# Patient Record
Sex: FEMALE | Race: WHITE | NOT HISPANIC OR LATINO | Employment: PART TIME | URBAN - NONMETROPOLITAN AREA
[De-identification: names, ages, dates, MRNs, and addresses within clinical notes are randomized per-mention and may not be internally consistent; named-entity substitution may affect disease eponyms.]

---
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
## 2023-01-03 ENCOUNTER — HOSPITAL ENCOUNTER (OUTPATIENT)
Dept: PHYSICAL THERAPY | Facility: OTHER | Age: 18
Setting detail: THERAPIES SERIES
Discharge: HOME | End: 2023-01-03
Attending: CHIROPRACTOR
Payer: COMMERCIAL

## 2023-01-03 PROCEDURE — 97161 PT EVAL LOW COMPLEX 20 MIN: CPT | Mod: GP,PO

## 2023-01-03 PROCEDURE — 97110 THERAPEUTIC EXERCISES: CPT | Mod: GP,PO

## 2023-01-04 NOTE — PROGRESS NOTES
01/03/23 1400   General Information   Type of Visit Initial OP Ortho PT Evaluation   Start of Care Date 01/03/23   Referring Physician Arie Tay DC (PCP: Delma Carey APRN CNP)   Patient/Family Goals Statement To reduce her apin   Orders Evaluate and Treat   Date of Order 11/08/22   Certification Required? Yes   Medical Diagnosis Chronic midline low back pain with left-sided sciatica (M54.42, G89.29), Lumbar radiculopathy (M54.16), Segmental dysfunction of pelvic region (M99.05)   Surgical/Medical history reviewed Yes   Precautions/Limitations no known precautions/limitations       Present No   Body Part(s)   Body Part(s) Lumbar Spine/SI   Presentation and Etiology   Pertinent history of current problem (include personal factors and/or comorbidities that impact the POC) Patient is a 17 year old female referred to physical therapy, specifically the spine therapy program, for chronic low back pain. She started having this pain in 2018. After about 1 year of conservative treatment including PT and chiro, she decided to have surgery. She had a Laminectomy and diskectomy in October of 2019. Had some more physical therapy after this and did really well. In June of this year, she started to develop more back pain near graduation of high school. There was no injury that brought this on however her pain has just progressively worsened since that time. She has again tried chiropractic care with no long term relief. She has stopped going to this. She has back pain and symptoms into her left LE. This limits her physically and has been draining on other aspects of her life.   Impairments A. Pain;B. Decreased WB tolerance;D. Decreased ROM;E. Decreased flexibility;F. Decreased strength and endurance;K. Numbness;J. Burning   Functional Limitations perform activities of daily living;perform required work activities;perform desired leisure / sports activities   Symptom Location Low back and left LE    How/Where did it occur From insidious onset   Onset date of current episode/exacerbation 06/01/22   Chronicity New   Pain rating (0-10 point scale) Best (/10);Worst (/10)   Best (/10) 3   Worst (/10) 8   Pain quality A. Sharp;B. Dull;C. Aching;D. Burning;E. Shooting;F. Stabbing;G. Cramping   Frequency of pain/symptoms A. Constant   Pain/symptoms are: Worse during the day   Pain/symptoms exacerbated by A. Sitting;B. Walking;C. Lifting;D. Carrying;G. Certain positions;H. Overhead reach;I. Bending;J. ADL;K. Home tasks;L. Work tasks   Pain/symptoms eased by C. Rest;E. Changing positions;F. Certain positions;H. Cold   Prior Level of Function   Prior Level of Function-Mobility Independent   Prior Level of Function-ADLs Independent   Current Level of Function   Current Community Support Family/friend caregiver   Patient role/employment history A. Employed;B. 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 No   Have you fallen and had an injury in the past year? Yes   Is patient a fall risk? No   Fall screen comments Patient slipped and fell on her tailbone   Abuse Screen (yes response referral indicated)   Physical Signs of Abuse Present no   System Outcome Measures   Outcome Measures Low Back Pain (see Osw[place] and [place])   Lumbar Spine/SI Objective Findings   Neurological Testing Comments Positive slump test   Gait/Locomotion Slower gait speed but no major deviations noted on this date   Hamstring Flexibility Min limited on right, mod limited on left   Hip Flexor Flexibility Min limited   Piriformis Flexibility Min  limited bilaterally   Flexion ROM Patient is able to just reach slightly below her tibial plateau with bending forward   Extension ROM Min limited secondary to pain   Right Side Bending ROM Able to reach her lateral knee joint secondary to stiffness on her left side   Left Side Bending ROM Able to reach lateral knee joint line with fingertips secondary to pain   Lumbar ROM Comment Rotation: WFL   Hip Screen Scour; Negative, ARJUN/FADIR: negative   Hip Flexion (L2) Strength 4/5 on left, 5/5 on right   Hip Abduction Strength 5/5   Hip Adduction Strength 5/5   Knee Flexion Strength 5/5   Knee Extension (L3) Strength 4-/5 on left, 5/5 on right   Ankle Dorsiflexion (L4) Strength 5/5   Palpation Discomfort noted in lumbar paraspinals and gluteus medius bilaterally but L>R   Slump Test Positive on left   Observation Patient resting comfortably in chair. No apparent distress   Integumentary No significant findings   Posture Slight protraction of shoulders   Sensation Testing Intact to light touch   Planned Therapy Interventions   Planned Therapy Interventions gait training;joint mobilization;manual therapy;neuromuscular re-education;ROM;strengthening;stretching   Planned Modality Interventions   Planned Modality Interventions Cryotherapy;Electrical stimulation;Hot packs;Ultrasound;Traction   Clinical Impression   Criteria for Skilled Therapeutic Interventions Met yes, treatment indicated   PT Diagnosis Impaired mobility, decreased strength and endurance, low back pain   Influenced by the following impairments pain, stiffness, weakness   Functional limitations due to impairments ambulation, standing, reaching, squatting   Clinical Presentation Stable/Uncomplicated   Clinical Presentation Rationale Clinical judgement   Clinical Decision Making (Complexity) Low complexity   Therapy Frequency 2 times/Week   Predicted Duration of Therapy Intervention (days/wks) 8-12 weeks   Risk & Benefits of therapy have been explained Yes    Patient, Family & other staff in agreement with plan of care Yes   Clinical Impression Comments Signs and symptoms consistent with chronic low back pain. Patient has more discomfort recently in her back and down her left LE. She had MRI's where it was reported she has another bulge at the L4-L5 level where the previous issue was a level lower. She has tried some conservative treatments but this has been unsuccessful this time around. SHe would benefit from skilled PT services, specifically the spine therapy program, in order to reduce her pain/symptoms and improve her mobility, strength, and endurance.   Education Assessment   Barriers to Learning No barriers   ORTHO GOALS   PT Ortho Eval Goals 1;2;3   Ortho Goal 1   Goal Identifier Ambulation   Goal Description Patient will be able to ambulate for longer than 10 minutes with no increase in pain in her back or leg in order to improve her overall mobility in the community   Target Date 02/28/23   Ortho Goal 2   Goal Identifier House tasks   Goal Description Patient will be able to complete all indoor household tasks, such as dishes, laundry, and cooking, with no increase in back or leg pain in order to improve her function at home and improve her ability to live on her own.   Target Date 02/28/23   Ortho Goal 3   Goal Identifier Sitting   Goal Description Patient will be able to sit for longer than 60 minutes with no increase in back or leg pain in order to improve her efficiency and safety with travel in a vehicle.   Target Date 02/28/23   Total Evaluation Time   PT Eval, Low Complexity Minutes (91513) 45   Therapy Certification   Certification date from 01/03/23   Certification date to 02/28/23   Medical Diagnosis Chronic midline low back pain with left-sided sciatica (M54.42, G89.29), Lumbar radiculopathy (M54.16), Segmental dysfunction of pelvic region (M99.05)

## 2023-01-04 NOTE — PROGRESS NOTES
[facility]    OUTPATIENT PHYSICAL THERAPY ORTHOPEDIC EVALUATION  PLAN OF TREATMENT FOR OUTPATIENT REHABILITATION  (COMPLETE FOR INITIAL CLAIMS ONLY)  Patient's Last Name, First Name, M.I.  YOB: 2005  Divya Evans    Provider s Name:  [facility]   Medical Record No.  [id number]   Start of Care Date:  01/03/23   Onset Date:  06/01/22   Type:     _X__PT   ___OT   ___SLP Medical Diagnosis:  Chronic midline low back pain with left-sided sciatica (M54.42, G89.29), Lumbar radiculopathy (M54.16), Segmental dysfunction of pelvic region (M99.05)     PT Diagnosis:  Impaired mobility, decreased strength and endurance, low back pain   Visits from SOC:  1      _________________________________________________________________________________  Plan of Treatment/Functional Goals:  gait training, joint mobilization, manual therapy, neuromuscular re-education, ROM, strengthening, stretching     Cryotherapy, Electrical stimulation, Hot packs, Ultrasound, Traction     Goals  Goal Identifier: Ambulation  Goal Description: Patient will be able to ambulate for longer than 10 minutes with no increase in pain in her back or leg in order to improve her overall mobility in the community  Target Date: 02/28/23    Goal Identifier: House tasks  Goal Description: Patient will be able to complete all indoor household tasks, such as dishes, laundry, and cooking, with no increase in back or leg pain in order to improve her function at home and improve her ability to live on her own.  Target Date: 02/28/23    Goal Identifier: Sitting  Goal Description: Patient will be able to sit for longer than 60 minutes with no increase in back or leg pain in order to improve her efficiency and safety with travel in a vehicle.  Target Date: 02/28/23         Therapy Frequency:  2 times/Week  Predicted Duration  of Therapy Intervention:  8-12 weeks    Matt Arrington, PT                 I CERTIFY THE NEED FOR THESE SERVICES FURNISHED UNDER        THIS PLAN OF TREATMENT AND WHILE UNDER MY CARE     (Physician co-signature of this document indicates review and certification of the therapy plan).                     Certification Date From:  01/03/23   Certification Date To:  02/28/23    Referring Provider:  Arie Tay DC (PCP: Delma Carey APRN CNP)    Initial Assessment        See Epic Evaluation Start of Care Date: 01/03/23

## 2023-01-05 ENCOUNTER — HOSPITAL ENCOUNTER (OUTPATIENT)
Dept: PHYSICAL THERAPY | Facility: OTHER | Age: 18
Setting detail: THERAPIES SERIES
Discharge: HOME | End: 2023-01-05
Attending: CHIROPRACTOR
Payer: COMMERCIAL

## 2023-01-05 PROCEDURE — 97110 THERAPEUTIC EXERCISES: CPT | Mod: GP,PO

## 2023-01-05 PROCEDURE — 97140 MANUAL THERAPY 1/> REGIONS: CPT | Mod: GP,PO

## 2023-01-10 ENCOUNTER — HOSPITAL ENCOUNTER (OUTPATIENT)
Dept: PHYSICAL THERAPY | Facility: OTHER | Age: 18
Setting detail: THERAPIES SERIES
Discharge: HOME | End: 2023-01-10
Attending: CHIROPRACTOR
Payer: COMMERCIAL

## 2023-01-10 PROCEDURE — 97140 MANUAL THERAPY 1/> REGIONS: CPT | Mod: GP,PO

## 2023-01-10 PROCEDURE — 97110 THERAPEUTIC EXERCISES: CPT | Mod: GP,PO

## 2023-01-12 ENCOUNTER — HOSPITAL ENCOUNTER (OUTPATIENT)
Dept: PHYSICAL THERAPY | Facility: OTHER | Age: 18
Setting detail: THERAPIES SERIES
Discharge: HOME | End: 2023-01-12
Attending: CHIROPRACTOR
Payer: COMMERCIAL

## 2023-01-12 PROCEDURE — 97110 THERAPEUTIC EXERCISES: CPT | Mod: GP,PO

## 2023-01-12 PROCEDURE — 97140 MANUAL THERAPY 1/> REGIONS: CPT | Mod: GP,PO

## 2023-01-17 ENCOUNTER — HOSPITAL ENCOUNTER (OUTPATIENT)
Dept: PHYSICAL THERAPY | Facility: OTHER | Age: 18
Setting detail: THERAPIES SERIES
Discharge: HOME | End: 2023-01-17
Attending: CHIROPRACTOR
Payer: COMMERCIAL

## 2023-01-17 PROCEDURE — 97110 THERAPEUTIC EXERCISES: CPT | Mod: GP,PO

## 2023-01-17 PROCEDURE — 97140 MANUAL THERAPY 1/> REGIONS: CPT | Mod: GP,PO

## 2023-01-19 ENCOUNTER — HOSPITAL ENCOUNTER (OUTPATIENT)
Dept: PHYSICAL THERAPY | Facility: OTHER | Age: 18
Setting detail: THERAPIES SERIES
Discharge: HOME | End: 2023-01-19
Attending: CHIROPRACTOR
Payer: COMMERCIAL

## 2023-01-19 PROCEDURE — 97110 THERAPEUTIC EXERCISES: CPT | Mod: GP,PO

## 2023-01-19 PROCEDURE — 97140 MANUAL THERAPY 1/> REGIONS: CPT | Mod: GP,PO

## 2023-01-26 ENCOUNTER — HOSPITAL ENCOUNTER (OUTPATIENT)
Dept: PHYSICAL THERAPY | Facility: OTHER | Age: 18
Setting detail: THERAPIES SERIES
Discharge: HOME | End: 2023-01-26
Attending: CHIROPRACTOR
Payer: COMMERCIAL

## 2023-01-26 PROCEDURE — 97110 THERAPEUTIC EXERCISES: CPT | Mod: GP,PO

## 2023-01-31 ENCOUNTER — HOSPITAL ENCOUNTER (OUTPATIENT)
Dept: PHYSICAL THERAPY | Facility: OTHER | Age: 18
Setting detail: THERAPIES SERIES
Discharge: HOME | End: 2023-01-31
Attending: CHIROPRACTOR
Payer: COMMERCIAL

## 2023-01-31 PROCEDURE — 97110 THERAPEUTIC EXERCISES: CPT | Mod: GP,PO

## 2023-01-31 PROCEDURE — 97140 MANUAL THERAPY 1/> REGIONS: CPT | Mod: GP,PO

## 2023-02-02 ENCOUNTER — HOSPITAL ENCOUNTER (OUTPATIENT)
Dept: PHYSICAL THERAPY | Facility: OTHER | Age: 18
Setting detail: THERAPIES SERIES
Discharge: HOME | End: 2023-02-02
Attending: CHIROPRACTOR
Payer: COMMERCIAL

## 2023-02-02 PROCEDURE — 97110 THERAPEUTIC EXERCISES: CPT | Mod: GP,PO

## 2023-02-07 ENCOUNTER — HOSPITAL ENCOUNTER (OUTPATIENT)
Dept: PHYSICAL THERAPY | Facility: OTHER | Age: 18
Setting detail: THERAPIES SERIES
Discharge: HOME | End: 2023-02-07
Attending: CHIROPRACTOR
Payer: COMMERCIAL

## 2023-02-07 ENCOUNTER — OFFICE VISIT (OUTPATIENT)
Dept: FAMILY MEDICINE | Facility: OTHER | Age: 18
End: 2023-02-07
Attending: CHIROPRACTOR
Payer: COMMERCIAL

## 2023-02-07 VITALS
OXYGEN SATURATION: 98 % | WEIGHT: 213 LBS | BODY MASS INDEX: 34.23 KG/M2 | SYSTOLIC BLOOD PRESSURE: 107 MMHG | HEIGHT: 66 IN | RESPIRATION RATE: 18 BRPM | TEMPERATURE: 98.8 F | DIASTOLIC BLOOD PRESSURE: 79 MMHG | HEART RATE: 90 BPM

## 2023-02-07 DIAGNOSIS — G89.29 CHRONIC MIDLINE LOW BACK PAIN WITH LEFT-SIDED SCIATICA: Primary | ICD-10-CM

## 2023-02-07 DIAGNOSIS — M54.16 LUMBAR RADICULOPATHY: ICD-10-CM

## 2023-02-07 DIAGNOSIS — M54.42 CHRONIC MIDLINE LOW BACK PAIN WITH LEFT-SIDED SCIATICA: Primary | ICD-10-CM

## 2023-02-07 DIAGNOSIS — M99.05 SEGMENTAL DYSFUNCTION OF PELVIC REGION: ICD-10-CM

## 2023-02-07 PROCEDURE — 99207 PR BACK PROGRAM: CPT | Performed by: CHIROPRACTOR

## 2023-02-07 PROCEDURE — G0463 HOSPITAL OUTPT CLINIC VISIT: HCPCS

## 2023-02-07 PROCEDURE — 97110 THERAPEUTIC EXERCISES: CPT | Mod: GP,PO

## 2023-02-07 ASSESSMENT — PAIN SCALES - GENERAL: PAINLEVEL: MILD PAIN (3)

## 2023-02-07 NOTE — PROGRESS NOTES
"Spine Therapy Program Mid-treatment Report      Subjective: Divya is here with her mother for follow up of her Spine Program.  She reports very good results thus far, noting 45% improvement as well as improvement in strength and flexibility.  She did take a fall back in December prior to starting therapy, landing directly on her bottom. This aggravated her left SI joint region and it continues to be dull/achy today.      Her mother reports that she can tell there is improvement.  Divya also notes studying yesterday while sitting on a barstool, something that she was unable to do prior to the program.  She is very happy with Matt as the provider and is seeing him after my appointment today.    Objective:  /79   Pulse 90   Temp 98.8  F (37.1  C) (Tympanic)   Resp 18   Ht 1.676 m (5' 6\")   Wt 96.6 kg (213 lb)   SpO2 98%   Breastfeeding No   BMI 34.38 kg/m    NAD.  She still presents with moderate hamstring spasm/tightness.  Sacral leg check positive on the left.  Palpation to left SI joint finds no segmental joint dysfunction, but there is pain with deep palpation.  Hip motion has improved from initial exam.  We also went over her recent x-rays.       Assessment/Plan: Continue plan as scheduled.  She is making acceptable progress. More therapy on posterior leg flexibility and left SI joint modality application is recommended.  Otherwise, her therapist is doing a wonderful job with the case.  I also discussed wearing better shoes and applying moist heat instead of ice.  We will follow up with her in another month at the completion of her plan.     Note to: LETY Hines, CNP      Arie Tay DC, JOSE ANTONIO, MARCIA  Director - Occupational Medicine Department  Diplomate - American Board of Forensic Professionals  Board Certified - American Board of Independent Medical Examiners  [facility]  [address] [place] [address]  Phone [phone]  Fax [phone]      3:19 PM 2/7/2023

## 2023-02-09 ENCOUNTER — HOSPITAL ENCOUNTER (OUTPATIENT)
Dept: PHYSICAL THERAPY | Facility: OTHER | Age: 18
Setting detail: THERAPIES SERIES
Discharge: HOME | End: 2023-02-09
Attending: CHIROPRACTOR
Payer: COMMERCIAL

## 2023-02-09 PROCEDURE — 97110 THERAPEUTIC EXERCISES: CPT | Mod: GP,PO

## 2023-02-14 ENCOUNTER — HOSPITAL ENCOUNTER (OUTPATIENT)
Dept: PHYSICAL THERAPY | Facility: OTHER | Age: 18
Setting detail: THERAPIES SERIES
Discharge: HOME | End: 2023-02-14
Attending: CHIROPRACTOR
Payer: COMMERCIAL

## 2023-02-14 PROCEDURE — 97110 THERAPEUTIC EXERCISES: CPT | Mod: GP,PO

## 2023-02-16 ENCOUNTER — HOSPITAL ENCOUNTER (OUTPATIENT)
Dept: PHYSICAL THERAPY | Facility: OTHER | Age: 18
Setting detail: THERAPIES SERIES
Discharge: HOME | End: 2023-02-16
Attending: CHIROPRACTOR
Payer: COMMERCIAL

## 2023-02-16 PROCEDURE — 97110 THERAPEUTIC EXERCISES: CPT | Mod: GP

## 2023-02-21 ENCOUNTER — HOSPITAL ENCOUNTER (OUTPATIENT)
Dept: PHYSICAL THERAPY | Facility: OTHER | Age: 18
Setting detail: THERAPIES SERIES
Discharge: HOME | End: 2023-02-21
Attending: CHIROPRACTOR
Payer: COMMERCIAL

## 2023-02-21 PROCEDURE — 97110 THERAPEUTIC EXERCISES: CPT | Mod: GP,PO

## 2023-02-23 ENCOUNTER — HOSPITAL ENCOUNTER (OUTPATIENT)
Dept: PHYSICAL THERAPY | Facility: OTHER | Age: 18
Setting detail: THERAPIES SERIES
Discharge: HOME | End: 2023-02-23
Attending: CHIROPRACTOR
Payer: COMMERCIAL

## 2023-02-23 PROCEDURE — 97110 THERAPEUTIC EXERCISES: CPT | Mod: GP,PO

## 2023-02-28 ENCOUNTER — HOSPITAL ENCOUNTER (OUTPATIENT)
Dept: PHYSICAL THERAPY | Facility: OTHER | Age: 18
Setting detail: THERAPIES SERIES
Discharge: HOME | End: 2023-02-28
Attending: CHIROPRACTOR
Payer: COMMERCIAL

## 2023-02-28 PROCEDURE — 97110 THERAPEUTIC EXERCISES: CPT | Mod: GP,PO

## 2023-03-02 ENCOUNTER — HOSPITAL ENCOUNTER (OUTPATIENT)
Dept: PHYSICAL THERAPY | Facility: OTHER | Age: 18
Setting detail: THERAPIES SERIES
Discharge: HOME | End: 2023-03-02
Attending: CHIROPRACTOR
Payer: COMMERCIAL

## 2023-03-02 PROCEDURE — 97110 THERAPEUTIC EXERCISES: CPT | Mod: GP,PO

## 2023-03-07 ENCOUNTER — OFFICE VISIT (OUTPATIENT)
Dept: FAMILY MEDICINE | Facility: OTHER | Age: 18
End: 2023-03-07
Attending: CHIROPRACTOR
Payer: COMMERCIAL

## 2023-03-07 ENCOUNTER — HOSPITAL ENCOUNTER (OUTPATIENT)
Dept: PHYSICAL THERAPY | Facility: OTHER | Age: 18
Setting detail: THERAPIES SERIES
Discharge: HOME | End: 2023-03-07
Attending: CHIROPRACTOR
Payer: COMMERCIAL

## 2023-03-07 VITALS
SYSTOLIC BLOOD PRESSURE: 104 MMHG | RESPIRATION RATE: 16 BRPM | HEIGHT: 66 IN | WEIGHT: 203 LBS | HEART RATE: 119 BPM | OXYGEN SATURATION: 98 % | BODY MASS INDEX: 32.62 KG/M2 | DIASTOLIC BLOOD PRESSURE: 77 MMHG

## 2023-03-07 DIAGNOSIS — M99.05 SEGMENTAL DYSFUNCTION OF PELVIC REGION: ICD-10-CM

## 2023-03-07 DIAGNOSIS — M54.16 LUMBAR RADICULOPATHY: ICD-10-CM

## 2023-03-07 DIAGNOSIS — G89.29 CHRONIC MIDLINE LOW BACK PAIN WITH LEFT-SIDED SCIATICA: Primary | ICD-10-CM

## 2023-03-07 DIAGNOSIS — M54.42 CHRONIC MIDLINE LOW BACK PAIN WITH LEFT-SIDED SCIATICA: Primary | ICD-10-CM

## 2023-03-07 PROCEDURE — G0463 HOSPITAL OUTPT CLINIC VISIT: HCPCS

## 2023-03-07 PROCEDURE — 99207 PR BACK PROGRAM: CPT | Performed by: CHIROPRACTOR

## 2023-03-07 PROCEDURE — 97110 THERAPEUTIC EXERCISES: CPT | Mod: GP,PO

## 2023-03-07 ASSESSMENT — PAIN SCALES - GENERAL: PAINLEVEL: MODERATE PAIN (4)

## 2023-03-07 NOTE — PROGRESS NOTES
"Spine Therapy Program Final Report    Subjective: Divya has completed our Spine Program. At our 4 week follow up, she reported overall improvement of 45%.  Today, upon completion of our plan, she remains at only 45% overall improvement.  Improvement plateaued after my last encounter with her.  She is looking for new options now as pain continues at the left lower back region and travels down the leg.  She is here with her mother today.    Objective:  /77   Pulse 119   Resp 16   Ht 1.676 m (5' 6\")   Wt 92.1 kg (203 lb)   SpO2 98%   Breastfeeding No   BMI 32.77 kg/m   NAD      Assessment/Plan: Divya has completed our Spine Program and, unfortunately, attained MMI at about the mid-point of the treatment plan.  She did inquire about moving her physical therapy over to Anabela Kimble for a more tissue approach utilizing trigger point massage etc.  I placed an order for this, but we had a long discussion regarding the disc involvement and that she should consult with our neurosurgeon about long-term options.    I've decided to order SHASHI at the L4-5 level.  She will continue her HEP, work on weight loss, and start using the exercise bike.  Revisit with me in 3 months.  Should she not have significant gains by that time, we can consider new MRI and referral to Dr. White.    Phoned Divya with this plan this morning and she agreed to this.  Referrals placed.    Arie Tay DC, JOSE ANTONIO, MARCIA  Director - Occupational Medicine Department  Diplomate of the American Board of Forensic Professionals  Board Certified - American Board of Independent Medical Examiners  [address] [address]  Phone [phone]  Fax [phone]      9:30 AM 3/7/2023    "

## 2023-03-14 ENCOUNTER — VIRTUAL VISIT (OUTPATIENT)
Dept: FAMILY MEDICINE | Facility: OTHER | Age: 18
End: 2023-03-14
Attending: NURSE PRACTITIONER
Payer: COMMERCIAL

## 2023-03-14 ENCOUNTER — MYC MEDICAL ADVICE (OUTPATIENT)
Dept: FAMILY MEDICINE | Facility: OTHER | Age: 18
End: 2023-03-14

## 2023-03-14 DIAGNOSIS — M54.16 LUMBAR RADICULOPATHY: ICD-10-CM

## 2023-03-14 DIAGNOSIS — G89.29 CHRONIC BILATERAL LOW BACK PAIN WITH LEFT-SIDED SCIATICA: Primary | ICD-10-CM

## 2023-03-14 DIAGNOSIS — F32.9 REACTIVE DEPRESSION: ICD-10-CM

## 2023-03-14 DIAGNOSIS — M54.42 CHRONIC BILATERAL LOW BACK PAIN WITH LEFT-SIDED SCIATICA: Primary | ICD-10-CM

## 2023-03-14 PROCEDURE — 99214 OFFICE O/P EST MOD 30 MIN: CPT | Mod: VID | Performed by: NURSE PRACTITIONER

## 2023-03-14 RX ORDER — PREDNISONE 20 MG/1
TABLET ORAL
Qty: 20 TABLET | Refills: 0 | Status: SHIPPED | OUTPATIENT
Start: 2023-03-14 | End: 2023-06-06

## 2023-03-14 RX ORDER — DULOXETIN HYDROCHLORIDE 30 MG/1
30 CAPSULE, DELAYED RELEASE ORAL DAILY
Qty: 90 CAPSULE | Refills: 0 | Status: SHIPPED | OUTPATIENT
Start: 2023-03-14 | End: 2023-12-18

## 2023-03-14 ASSESSMENT — ANXIETY QUESTIONNAIRES
GAD7 TOTAL SCORE: 9
IF YOU CHECKED OFF ANY PROBLEMS ON THIS QUESTIONNAIRE, HOW DIFFICULT HAVE THESE PROBLEMS MADE IT FOR YOU TO DO YOUR WORK, TAKE CARE OF THINGS AT HOME, OR GET ALONG WITH OTHER PEOPLE: SOMEWHAT DIFFICULT
3. WORRYING TOO MUCH ABOUT DIFFERENT THINGS: MORE THAN HALF THE DAYS
7. FEELING AFRAID AS IF SOMETHING AWFUL MIGHT HAPPEN: SEVERAL DAYS
2. NOT BEING ABLE TO STOP OR CONTROL WORRYING: SEVERAL DAYS
1. FEELING NERVOUS, ANXIOUS, OR ON EDGE: MORE THAN HALF THE DAYS
GAD7 TOTAL SCORE: 9
5. BEING SO RESTLESS THAT IT IS HARD TO SIT STILL: NOT AT ALL
6. BECOMING EASILY ANNOYED OR IRRITABLE: MORE THAN HALF THE DAYS

## 2023-03-14 ASSESSMENT — PATIENT HEALTH QUESTIONNAIRE - PHQ9
SUM OF ALL RESPONSES TO PHQ QUESTIONS 1-9: 9
5. POOR APPETITE OR OVEREATING: SEVERAL DAYS

## 2023-03-14 ASSESSMENT — ENCOUNTER SYMPTOMS
BACK PAIN: 1
PARESTHESIAS: 1
WEAKNESS: 0
ACTIVITY CHANGE: 1
DYSPHORIC MOOD: 1

## 2023-03-14 NOTE — NURSING NOTE
Patient is needing visit today for back pain.    Medication Reconciliation Complete    Trudi Cat LPN  3/14/2023 7:42 AM  
No complaints

## 2023-03-14 NOTE — PROGRESS NOTES
Divya is a 17 year old who is being evaluated via a billable video visit.      How would you like to obtain your AVS? MyChart  If the video visit is dropped, the invitation should be resent by: Text to cell phone: [phone]  Will anyone else be joining your video visit? No        Assessment & Plan   Problem List Items Addressed This Visit        Nervous and Auditory    Chronic back pain - Primary    Relevant Medications    predniSONE (DELTASONE) 20 MG tablet    tiZANidine (ZANAFLEX) 4 MG tablet    DULoxetine (CYMBALTA) 30 MG capsule    Lumbar radiculopathy    Relevant Medications    predniSONE (DELTASONE) 20 MG tablet    tiZANidine (ZANAFLEX) 4 MG tablet    DULoxetine (CYMBALTA) 30 MG capsule   Other Visit Diagnoses     Reactive depression        Relevant Medications    DULoxetine (CYMBALTA) 30 MG capsule      Cute onset of low back pain on top of chronic back pain.  We will do prednisone taper, discontinue Flexeril and start tizanidine for muscle relaxer.  Encouraged ice, ibuprofen or Tylenol, stretches and movement.  We will also do a trial of Cymbalta 30 mg daily to help with nerve pain as well as depression symptoms.  I like to see her back either in person or through video visit in 1 month to review medications, sooner if she is having concerns.    Epidural injection was ordered by occupational medicine, this has not been scheduled yet.  Last MRI was July 2022.  Given increased pain, this was reviewed with occupational medicine through the spine program to see if MRI should be updated sooner.  Plan will be to see how she responds to prednisone.  We are able to get her into physical therapy on Thursday for acute pain management as well.    Discussion of management or test interpretation with external physician/other qualified healthcare professional/appropriate source - occupational medicine  Prescription drug management  36 minutes spent on the date of the encounter doing chart review, history and exam,  documentation and further activities per the note       No follow-ups on file.    LETY Alonso CNP  [facility]   Divya is a 17 year old, presenting for the following health issues:  Back Pain      Back Pain   Associated symptoms include paresthesias. Pertinent negatives include no weakness.      Video visit was completed with patient today due to back pain.  She has chronic back pain, has been working with spine program and has injections ordered.  She will be moving over to a different form of physical therapy in mid April.  Friday she is getting ready for work and went to lay down for a few minutes.  She went to go get up and could not move her left leg, she had severe low back pain.  This is the worst pain she is ever had.  Over the weekend she used her muscle relaxers which has not been helpful, ibuprofen, Tylenol, rest, ice and some stretching.  This has slowly improved but continues to be quite uncomfortable.  She was having difficulties walking through the weekend.  She has not had any bowel or bladder issues, no saddle numbness.  Denies any numbness or tingling.  She had sharp pain that is radiated down her left leg.  She has noticed increased depression anxiety symptoms that seems to be worsened with her current pain.  She is interested in a medication for depression that may also help with nerve pain.  She has never been on anything like this in the past.        Review of Systems   Constitutional: Positive for activity change.   Genitourinary: Negative.    Musculoskeletal: Positive for back pain.   Skin: Negative.    Neurological: Positive for paresthesias. Negative for weakness.   Psychiatric/Behavioral: Positive for dysphoric mood.            Objective    Vitals - Patient Reported  Pain Score: Extreme Pain (8)  Pain Loc: Mid Back        Physical Exam   GENERAL: Healthy, alert and no distress  EYES: Eyes grossly normal to inspection.  No discharge or erythema,  or obvious scleral/conjunctival abnormalities.  RESP: No audible wheeze, cough, or visible cyanosis.  No visible retractions or increased work of breathing.    SKIN: Visible skin clear. No significant rash, abnormal pigmentation or lesions.  NEURO: Cranial nerves grossly intact.  Mentation and speech appropriate for age.  PSYCH: Mentation appears normal, affect normal/bright, judgement and insight intact, normal speech and appearance well-groomed.                Video-Visit Details    Type of service:  Video Visit   Video Start Time: 740  Video End Time:802    Originating Location (pt. Location): Home  Distant Location (provider location):  On-site  Platform used for Video Visit: Notizza

## 2023-03-16 ENCOUNTER — HOSPITAL ENCOUNTER (OUTPATIENT)
Dept: PHYSICAL THERAPY | Facility: OTHER | Age: 18
Setting detail: THERAPIES SERIES
Discharge: HOME | End: 2023-03-16
Attending: CHIROPRACTOR
Payer: COMMERCIAL

## 2023-03-16 PROCEDURE — 97140 MANUAL THERAPY 1/> REGIONS: CPT | Mod: GP

## 2023-03-16 NOTE — PROGRESS NOTES
[facility]    OUTPATIENT PHYSICAL THERAPY  PLAN OF TREATMENT FOR OUTPATIENT REHABILITATION AND PROGRESS NOTE           Patient's Last Name, First Name, Divya Kan Date of Birth  2005   Provider's Name  [facility] Medical Record No.  [id number]    Onset Date  6/1/22 Start of Care Date  1/3/23   Type:     _X_PT   ___OT   ___SLP Medical Diagnosis  Chronic midline low back pain with left-sided sciatica (M54.42, G89.29), Lumbar radiculopathy (M54.16), Segmental dysfunction of pelvic region (M99.05)   PT Diagnosis  Impaired mobility, decreased strength and endurance, low back pain Plan of Treatment  Frequency/Duration: 1 times per week, as needed for 3 months  Certification date from 3/1/23 to 5/28/23     gait training, joint mobilization, manual therapy, neuromuscular re-education, ROM, strengthening, stretching     Cryotherapy, Electrical stimulation, Hot packs, Ultrasound, Traction        Goals:  Goal Identifier Ambulation   Goal Description Patient will be able to ambulate for longer than 10 minutes with no increase in pain in her back or leg in order to improve her overall mobility in the community   Target Date 05/28/23   Date Met      Progress (detail required for progress note): Patient notes she was in bed most of the week due to increased pain.      Goal Identifier House tasks   Goal Description Patient will be able to complete all indoor household tasks, such as dishes, laundry, and cooking, with no increase in back or leg pain in order to improve her function at home and improve her ability to live on her own.   Target Date 05/28/23   Date Met      Progress (detail required for progress note): Decline due to increased pain over the last week     Goal Identifier Sitting   Goal Description Patient will be able to sit for longer than 60 minutes  with no increase in back or leg pain in order to improve her efficiency and safety with travel in a vehicle.   Target Date 05/28/23   Date Met      Progress (detail required for progress note): decline over the last week due to increased pain. Had been progressing           Client Self (Subjective) Report for Progress Note Reporting Period: (P) Over the week, patient has had large increase in pain. Woke up last friday morning with intense pain, no injury occurred, has been at home in bed most of the week. Started prednisone, tizanidine, duloxitine. Starting to feel better today. Thinks that maybe increased stress has led to the increase in pain. has upcoming trip to [place] and has been worried about how her back was going feel during that time. Left leg has been feeling week. Has not been to school or work all week.      Objective Measurements:   Objective Measure: (P) Postural loading  Details: (P) Limited loading shoulders L/L R/R L/R R/L pelvis R/R; general listneing to left abdomen then right abdomen; local listengin bilateral kidneys, mesenteric root.  Objective Measure: (P) joint  Details: (P) - FFT, - seated FFT  Objective Measure: (P) myofascial  Details: (P) bilateral renal fascia, mesenteric root, small intestine loops                            I CERTIFY THE NEED FOR THESE SERVICES FURNISHED UNDER        THIS PLAN OF TREATMENT AND WHILE UNDER MY CARE     (Physician co-signature of this document indicates review and certification of the therapy plan).              Referring Provider: LETY Hines, OSMIN/ SEE Christianson, PT

## 2023-04-07 ENCOUNTER — HOSPITAL ENCOUNTER (OUTPATIENT)
Dept: PHYSICAL THERAPY | Facility: OTHER | Age: 18
Setting detail: THERAPIES SERIES
Discharge: HOME | End: 2023-04-07
Attending: CHIROPRACTOR
Payer: COMMERCIAL

## 2023-04-07 PROCEDURE — 97112 NEUROMUSCULAR REEDUCATION: CPT | Mod: GP

## 2023-04-07 PROCEDURE — 97140 MANUAL THERAPY 1/> REGIONS: CPT | Mod: GP

## 2023-04-19 ENCOUNTER — HOSPITAL ENCOUNTER (OUTPATIENT)
Dept: PHYSICAL THERAPY | Facility: OTHER | Age: 18
Setting detail: THERAPIES SERIES
Discharge: HOME | End: 2023-04-19
Attending: CHIROPRACTOR
Payer: COMMERCIAL

## 2023-04-19 PROCEDURE — 97140 MANUAL THERAPY 1/> REGIONS: CPT | Mod: GP

## 2023-04-25 ENCOUNTER — HOSPITAL ENCOUNTER (OUTPATIENT)
Dept: PHYSICAL THERAPY | Facility: OTHER | Age: 18
Setting detail: THERAPIES SERIES
Discharge: HOME | End: 2023-04-25
Attending: CHIROPRACTOR
Payer: COMMERCIAL

## 2023-04-25 PROCEDURE — 97112 NEUROMUSCULAR REEDUCATION: CPT | Mod: GP

## 2023-04-25 PROCEDURE — 97140 MANUAL THERAPY 1/> REGIONS: CPT | Mod: GP

## 2023-04-27 ENCOUNTER — HOSPITAL ENCOUNTER (OUTPATIENT)
Dept: PHYSICAL THERAPY | Facility: OTHER | Age: 18
Setting detail: THERAPIES SERIES
Discharge: HOME | End: 2023-04-27
Attending: CHIROPRACTOR
Payer: COMMERCIAL

## 2023-04-27 PROCEDURE — 97140 MANUAL THERAPY 1/> REGIONS: CPT | Mod: GP

## 2023-04-27 PROCEDURE — 97112 NEUROMUSCULAR REEDUCATION: CPT | Mod: GP

## 2023-05-02 ENCOUNTER — HOSPITAL ENCOUNTER (OUTPATIENT)
Dept: PHYSICAL THERAPY | Facility: OTHER | Age: 18
Setting detail: THERAPIES SERIES
Discharge: HOME | End: 2023-05-02
Attending: CHIROPRACTOR
Payer: COMMERCIAL

## 2023-05-02 PROCEDURE — 97140 MANUAL THERAPY 1/> REGIONS: CPT | Mod: GP

## 2023-05-02 PROCEDURE — 97112 NEUROMUSCULAR REEDUCATION: CPT | Mod: GP

## 2023-05-06 ENCOUNTER — E-VISIT (OUTPATIENT)
Dept: FAMILY MEDICINE | Facility: OTHER | Age: 18
End: 2023-05-06
Payer: COMMERCIAL

## 2023-05-06 DIAGNOSIS — Z53.9 ERRONEOUS ENCOUNTER--DISREGARD: Primary | ICD-10-CM

## 2023-05-06 ASSESSMENT — ANXIETY QUESTIONNAIRES
3. WORRYING TOO MUCH ABOUT DIFFERENT THINGS: SEVERAL DAYS
7. FEELING AFRAID AS IF SOMETHING AWFUL MIGHT HAPPEN: SEVERAL DAYS
5. BEING SO RESTLESS THAT IT IS HARD TO SIT STILL: SEVERAL DAYS
4. TROUBLE RELAXING: SEVERAL DAYS
2. NOT BEING ABLE TO STOP OR CONTROL WORRYING: SEVERAL DAYS
GAD7 TOTAL SCORE: 7
8. IF YOU CHECKED OFF ANY PROBLEMS, HOW DIFFICULT HAVE THESE MADE IT FOR YOU TO DO YOUR WORK, TAKE CARE OF THINGS AT HOME, OR GET ALONG WITH OTHER PEOPLE?: SOMEWHAT DIFFICULT
7. FEELING AFRAID AS IF SOMETHING AWFUL MIGHT HAPPEN: SEVERAL DAYS
GAD7 TOTAL SCORE: 7
6. BECOMING EASILY ANNOYED OR IRRITABLE: SEVERAL DAYS
GAD7 TOTAL SCORE: 7
1. FEELING NERVOUS, ANXIOUS, OR ON EDGE: SEVERAL DAYS

## 2023-05-06 ASSESSMENT — PATIENT HEALTH QUESTIONNAIRE - PHQ9
SUM OF ALL RESPONSES TO PHQ QUESTIONS 1-9: 11
SUM OF ALL RESPONSES TO PHQ QUESTIONS 1-9: 11
10. IF YOU CHECKED OFF ANY PROBLEMS, HOW DIFFICULT HAVE THESE PROBLEMS MADE IT FOR YOU TO DO YOUR WORK, TAKE CARE OF THINGS AT HOME, OR GET ALONG WITH OTHER PEOPLE: SOMEWHAT DIFFICULT

## 2023-05-07 ASSESSMENT — ANXIETY QUESTIONNAIRES: GAD7 TOTAL SCORE: 7

## 2023-05-07 ASSESSMENT — PATIENT HEALTH QUESTIONNAIRE - PHQ9: SUM OF ALL RESPONSES TO PHQ QUESTIONS 1-9: 11

## 2023-05-08 NOTE — TELEPHONE ENCOUNTER
"Call and spoke with patient to triage based on E-visits and answering \"several days\" to the question of thoughts of hurting self/ suicidal thoughts. Patient states she does not have these thoughts and was \"just trying to get setup with visit to be seen to get medications filled\". Patient was transferred to appointment line  "

## 2023-05-18 ENCOUNTER — THERAPY VISIT (OUTPATIENT)
Dept: PHYSICAL THERAPY | Facility: OTHER | Age: 18
End: 2023-05-18
Attending: CHIROPRACTOR
Payer: COMMERCIAL

## 2023-05-18 DIAGNOSIS — M54.42 ACUTE BACK PAIN WITH SCIATICA, LEFT: Primary | ICD-10-CM

## 2023-05-18 DIAGNOSIS — M54.16 LUMBAR RADICULOPATHY: ICD-10-CM

## 2023-05-18 DIAGNOSIS — M99.05 SOMATIC DYSFUNCTION OF PELVIS REGION: ICD-10-CM

## 2023-05-18 DIAGNOSIS — G89.29 OTHER CHRONIC PAIN: ICD-10-CM

## 2023-05-18 PROCEDURE — 97140 MANUAL THERAPY 1/> REGIONS: CPT | Mod: GP

## 2023-05-18 PROCEDURE — 97112 NEUROMUSCULAR REEDUCATION: CPT | Mod: GP

## 2023-05-18 NOTE — PROGRESS NOTES
05/18/23 0500   Appointment Info   Signing clinician's name / credentials Anabela Kimble, PT, CLT, CAPP   Visits Used 25   Quick Adds Certification   Progress Note/Certification   Certification date to 05/28/23   GOALS   PT Goals 2;3;4   PT Goal 1   Goal Identifier ambulation   Goal Description Patient will be able to ambulate for longer than 10 minutes with no increase in pain in her back or leg in order to improve her overall mobility in the community   Goal Progress Able to complete ambulation, walks twice daily for one mile.   Target Date 02/28/23   Date Met 05/18/23   PT Goal 2   Goal Identifier house tasks   Goal Description Patient will be able to complete all indoor household tasks, such as dishes, laundry, and cooking, with no increase in back or leg pain in order to improve her function at home and improve her ability to live on her own.   Goal Progress Able to complete all indoor house hold tasks without limitation.   Target Date 02/28/23   Date Met 05/18/23   PT Goal 3   Goal Identifier sitting   Goal Description Patient will be able to sit for longer than 60 minutes with no increase in back or leg pain in order to improve her efficiency and safety with travel in a vehicle.   Goal Progress Typically does not sit long but is able to sit without limitations.   Target Date 02/28/23   Date Met 05/18/23   Subjective Report   Subjective Report Walking daily twice a day, over a mile. House hold taks are fine, no issues. No long term sitting due to activty but no issues noted.   Objective Measure 1   Objective Measure postural loading   Details limited loading right shoulder right pelvis; general listening to right anteiror pelvis; local listening to right obturator fascia   Objective Measure 2   Objective Measure joint   Details ERS right L5   Objective Measure 3   Objective Measure myofascial   Details right obturator artery, abdominal aorta   Treatment Interventions (PT)   Interventions Neuromuscular  Re-education;Manual Therapy   Neuromuscular Re-education   Neuromuscular re-ed of mvmt, balance, coord, kinesthetic sense, posture, proprioception minutes (77764) 10   Neuro Re-ed 1 muscle energy technqiue (MET) to restore function and reduce pain   Neuro Re-ed 1 - Details MET- ERS right L5, reviewed of mini squats for glut max retraining   Manual Therapy   Manual Therapy: Mobilization, MFR, MLD, friction massage minutes (82922) 20   Patient Response/Progress improved loading right shoulder and pelvis   Manual Therapy 1 MFR, organ specfic fascial mobilization (OSFM) to restore function and reduce pain   Manual Therapy 1 - Details MFR/OSFM- right obturator artery<>right iliac art, abdominal aorta, bilateral femoral arteries.   Plan   Plan for next session see dc summary   Total Session Time   Timed Code Treatment Minutes 30   Total Treatment Time (sum of timed and untimed services) 30             DISCHARGE  Reason for Discharge: Patient has met all goals.    Equipment Issued: NA    Discharge Plan: Patient to continue home program.    Referring Provider:  Arie Tay

## 2023-06-05 ASSESSMENT — PATIENT HEALTH QUESTIONNAIRE - PHQ9
SUM OF ALL RESPONSES TO PHQ QUESTIONS 1-9: 8
SUM OF ALL RESPONSES TO PHQ QUESTIONS 1-9: 8

## 2023-06-06 ENCOUNTER — VIRTUAL VISIT (OUTPATIENT)
Dept: FAMILY MEDICINE | Facility: OTHER | Age: 18
End: 2023-06-06
Attending: NURSE PRACTITIONER
Payer: COMMERCIAL

## 2023-06-06 DIAGNOSIS — F32.9 REACTIVE DEPRESSION: ICD-10-CM

## 2023-06-06 DIAGNOSIS — M54.16 LUMBAR RADICULOPATHY: ICD-10-CM

## 2023-06-06 DIAGNOSIS — G89.29 CHRONIC BILATERAL LOW BACK PAIN WITH LEFT-SIDED SCIATICA: ICD-10-CM

## 2023-06-06 DIAGNOSIS — M54.42 CHRONIC BILATERAL LOW BACK PAIN WITH LEFT-SIDED SCIATICA: ICD-10-CM

## 2023-06-06 PROCEDURE — 99213 OFFICE O/P EST LOW 20 MIN: CPT | Mod: VID | Performed by: NURSE PRACTITIONER

## 2023-06-06 RX ORDER — DULOXETIN HYDROCHLORIDE 30 MG/1
30 CAPSULE, DELAYED RELEASE ORAL DAILY
Qty: 90 CAPSULE | Refills: 3 | Status: CANCELLED | OUTPATIENT
Start: 2023-06-06

## 2023-06-06 RX ORDER — DULOXETIN HYDROCHLORIDE 60 MG/1
60 CAPSULE, DELAYED RELEASE ORAL DAILY
Qty: 90 CAPSULE | Refills: 0 | Status: SHIPPED | OUTPATIENT
Start: 2023-06-06 | End: 2023-08-17

## 2023-06-06 NOTE — PROGRESS NOTES
Divya is a 17 year old who is being evaluated via a billable video visit.      How would you like to obtain your AVS? MyChart  If the video visit is dropped, the invitation should be resent by: Text to cell phone: [phone]  Will anyone else be joining your video visit? No        Assessment & Plan   Problem List Items Addressed This Visit        Nervous and Auditory    Chronic back pain    Lumbar radiculopathy    Relevant Medications    DULoxetine (CYMBALTA) 60 MG capsule   Other Visit Diagnoses     Reactive depression        Relevant Medications    DULoxetine (CYMBALTA) 60 MG capsule      Increase duloxetine to 60 mg daily.  Recommend following up in the next couple months to see how this is working, sooner if she is having any issues.  She will also let myself or occupational medicine know if she would like to move forward with any further management of her low back pain.  She continues to work on exercise and weight loss.    Prescription drug management  23 minutes spent by me on the date of the encounter doing chart review, history and exam, documentation and further activities per the note      No follow-ups on file.    LETY Alonso [facility]    Subjective   Divya is a 17 year old, presenting for the following health issues:  Medication Therapy Management    HPI     Video visit completed today to follow-up on low back pain and depression.  She is been taking Cymbalta 30 mg daily.  She feels this is helping with her depression a little bit but feels it could be working better.  She is not having any negative side effects to the medication.  She does feel that this has been helping some with her nerve pain.  She has completed physical therapy for the spine program.  There has been a referral for injections, she opted to wait on this.  She would be due to follow-up with occupational medicine this month for further discussion of referral to spine specialist, repeat MRI  versus others.  She would like to see how she does over the next month before she moves forward with any further appointments regarding her low back.  She is moving to [place] this week.    Review of Systems   See above      Objective           Vitals:  No vitals were obtained today due to virtual visit.    Physical Exam   GENERAL: Healthy, alert and no distress  EYES: Eyes grossly normal to inspection.  No discharge or erythema, or obvious scleral/conjunctival abnormalities.  RESP: No audible wheeze, cough, or visible cyanosis.  No visible retractions or increased work of breathing.    SKIN: Visible skin clear. No significant rash, abnormal pigmentation or lesions.  NEURO: Cranial nerves grossly intact.  Mentation and speech appropriate for age.  PSYCH: Mentation appears normal, affect normal/bright, judgement and insight intact, normal speech and appearance well-groomed.            Video-Visit Details    Type of service:  Video Visit   Video Start Time: 8:30 am  Video End Time:8:47 AM    Originating Location (pt. Location): Home  Distant Location (provider location):  On-site  Platform used for Video Visit: [facility]    Answers for HPI/ROS submitted by the patient on 6/5/2023  PHQ9 TOTAL SCORE: 8        3/14/2023     7:58 AM 5/6/2023    12:09 AM 6/5/2023     9:54 PM   PHQ   PHQ-9 Total Score 9 11 8   Q9: Thoughts of better off dead/self-harm past 2 weeks Not at all Several days Not at all   F/U: Thoughts of suicide or self-harm  No    F/U: Safety concerns  No          3/14/2023     7:58 AM 5/6/2023    12:09 AM   ALEJANDRO-7 SCORE   Total Score  7 (mild anxiety)   Total Score 9 7

## 2023-06-06 NOTE — LETTER
My Depression Action Plan  Name: Divya Evans   Date of Birth 2005  Date: 6/6/2023    My doctor: Delma Carey   My clinic: [facility]  [address]  [place] [id number] [phone]            GREEN    ZONE   Good Control    What it looks like:   Things are going generally well. You have normal ups and downs. You may even feel depressed from time to time, but bad moods usually last less than a day.   What you need to do:  Continue to care for yourself (see self care plan)  Check your depression survival kit and update it as needed  Follow your physician s recommendations including any medication.  Do not stop taking medication unless you consult with your physician first.             YELLOW         ZONE Getting Worse    What it looks like:   Depression is starting to interfere with your life.   It may be hard to get out of bed; you may be starting to isolate yourself from others.  Symptoms of depression are starting to last most all day and this has happened for several days.   You may have suicidal thoughts but they are not constant.   What you need to do:     Call your care team. Your response to treatment will improve if you keep your care team informed of your progress. Yellow periods are signs an adjustment may need to be made.     Continue your self-care.  Just get dressed and ready for the day.  Don't give yourself time to talk yourself out of it.    Talk to someone in your support network.    Open up your Depression Self-Care Plan/Wellness Kit.             RED    ZONE Medical Alert - Get Help    What it looks like:   Depression is seriously interfering with your life.   You may experience these or other symptoms: You can t get out of bed most days, can t work or engage in other necessary activities, you have trouble taking care of basic hygiene, or basic responsibilities, thoughts of suicide or death that will not go away, self-injurious  behavior.     What you need to do:  Call your care team and request a same-day appointment. If they are not available (weekends or after hours) call your local crisis line, emergency room or 911.          Depression Self-Care Plan / Wellness Kit    Many people find that medication and therapy are helpful treatments for managing depression. In addition, making small changes to your everyday life can help to boost your mood and improve your wellbeing. Below are some tips for you to consider. Be sure to talk with your medical provider and/or behavioral health consultant if your symptoms are worsening or not improving.     Sleep   Sleep hygiene  means all of the habits that support good, restful sleep. It includes maintaining a consistent bedtime and wake time, using your bedroom only for sleeping or sex, and keeping the bedroom dark and free of distractions like a computer, smartphone, or television.     Develop a Healthy Routine  Maintain good hygiene. Get out of bed in the morning, make your bed, brush your teeth, take a shower, and get dressed. Don t spend too much time viewing media that makes you feel stressed. Find time to relax each day.    Exercise  Get some form of exercise every day. This will help reduce pain and release endorphins, the  feel good  chemicals in your brain. It can be as simple as just going for a walk or doing some gardening, anything that will get you moving.      Diet  Strive to eat healthy foods, including fruits and vegetables. Drink plenty of water. Avoid excessive sugar, caffeine, alcohol, and other mood-altering substances.     Stay Connected with Others  Stay in touch with friends and family members.    Manage Your Mood  Try deep breathing, massage therapy, biofeedback, or meditation. Take part in fun activities when you can. Try to find something to smile about each day.     Psychotherapy  Be open to working with a therapist if your provider recommends it.     Medication  Be sure to  take your medication as prescribed. Most anti-depressants need to be taken every day. It usually takes several weeks for medications to work. Not all medicines work for all people. It is important to follow-up with your provider to make sure you have a treatment plan that is working for you. Do not stop your medication abruptly without first discussing it with your provider.    Crisis Resources   These hotlines are for both adults and children. They and are open 24 hours a day, 7 days a week unless noted otherwise.    National Suicide Prevention Lifeline   988 or 0-014-331-HBKZ (5325)    Crisis Text Line    www.crisistextline.org  Text HOME to 015427 from anywhere in the [place], anytime, about any type of crisis. A live, trained crisis counselor will receive the text and respond quickly.    [place] Lifeline for LGBTQ Youth  A national crisis intervention and suicide lifeline for LGBTQ youth under 25. Provides a safe place to talk without judgement. Call 1-988.370.1183; text START to 933254 or visit www.thetrevorproject.org to talk to a trained counselor.    For [facility] crisis numbers, visit the [facility] website at:  https://[place].gov/[place]hs/people-we-serve/adults/health-care/mental-health/resources/crisis-contacts.jsp

## 2023-06-07 DIAGNOSIS — F32.9 REACTIVE DEPRESSION: ICD-10-CM

## 2023-06-07 DIAGNOSIS — M54.16 LUMBAR RADICULOPATHY: ICD-10-CM

## 2023-06-07 DIAGNOSIS — M54.42 CHRONIC BILATERAL LOW BACK PAIN WITH LEFT-SIDED SCIATICA: ICD-10-CM

## 2023-06-07 DIAGNOSIS — G89.29 CHRONIC BILATERAL LOW BACK PAIN WITH LEFT-SIDED SCIATICA: ICD-10-CM

## 2023-06-12 RX ORDER — DULOXETIN HYDROCHLORIDE 30 MG/1
CAPSULE, DELAYED RELEASE ORAL
Qty: 90 CAPSULE | Refills: 0 | OUTPATIENT
Start: 2023-06-12

## 2023-06-12 NOTE — TELEPHONE ENCOUNTER
Last Prescription Date: 6/6/2023  Last Qty/Refills: 90 / R-1  Last Office Visit: 6/06/2023  Future Office Visit: None    Patients Duloxetine was increased to 60 mg. Will deny 30 mg.    Pearl Ramirez RN on 6/12/2023 at 11:13 AM       Requested Prescriptions   Pending Prescriptions Disp Refills     DULoxetine (CYMBALTA) 30 MG capsule [Pharmacy Med Name: DULOXETINE DR 30MG CAPSULES] 90 capsule 0     Sig: TAKE 1 CAPSULE(30 MG) BY MOUTH DAILY       Serotonin-Norepinephrine Reuptake Inhibitors  Failed - 6/12/2023 11:12 AM        Failed - PHQ-9 score of less than 5 in past 6 months     Please review last PHQ-9 score.

## 2023-08-14 DIAGNOSIS — F32.9 REACTIVE DEPRESSION: ICD-10-CM

## 2023-08-16 NOTE — TELEPHONE ENCOUNTER
[facility] sent Rx request for the following:    DULOXETINE DR 60MG CAPSULES   Last Prescription Date:   6/6/23  Last Fill Qty/Refills:         90, R-0    Last Office Visit:              6/6/23   Future Office visit:           none     Monique Meneses RN on 8/16/2023 at 1:50 PM

## 2023-08-17 RX ORDER — DULOXETIN HYDROCHLORIDE 60 MG/1
60 CAPSULE, DELAYED RELEASE ORAL DAILY
Qty: 90 CAPSULE | Refills: 0 | Status: SHIPPED | OUTPATIENT
Start: 2023-08-17 | End: 2023-11-09

## 2023-11-09 ENCOUNTER — MYC REFILL (OUTPATIENT)
Dept: FAMILY MEDICINE | Facility: OTHER | Age: 18
End: 2023-11-09
Payer: COMMERCIAL

## 2023-11-09 DIAGNOSIS — F32.9 REACTIVE DEPRESSION: ICD-10-CM

## 2023-11-14 RX ORDER — DULOXETIN HYDROCHLORIDE 60 MG/1
60 CAPSULE, DELAYED RELEASE ORAL DAILY
Qty: 90 CAPSULE | Refills: 0 | Status: SHIPPED | OUTPATIENT
Start: 2023-11-14 | End: 2023-12-18

## 2023-11-14 NOTE — TELEPHONE ENCOUNTER
"[facility] in [place] sent Rx request for the following:      Requested Prescriptions   Pending Prescriptions Disp Refills    DULoxetine (CYMBALTA) 60 MG capsule 90 capsule 0     Sig: Take 1 capsule (60 mg) by mouth daily       Serotonin-Norepinephrine Reuptake Inhibitors  Failed - 11/14/2023  3:32 PM        Failed - PHQ-9 score of less than 5 in past 6 months     Please review last PHQ-9 score.             Last Prescription Date:   8/17/23  Last Fill Qty/Refills:         90, R-0    Last Office Visit:              6/6/23 VV   Future Office visit:           none        Routing refill request to provider for review/approval because:  Patient needs to be seen because:  Per VV on 6/6/23, \"Increase duloxetine to 60 mg daily.  Recommend following up in the next couple months to see how this is working, sooner if she is having any issues.\"    CHANCE TINSLEY RN on 11/14/2023 at 3:57 PM        "

## 2023-12-18 ENCOUNTER — VIRTUAL VISIT (OUTPATIENT)
Dept: FAMILY MEDICINE | Facility: OTHER | Age: 18
End: 2023-12-18
Attending: NURSE PRACTITIONER
Payer: COMMERCIAL

## 2023-12-18 DIAGNOSIS — F32.9 REACTIVE DEPRESSION: ICD-10-CM

## 2023-12-18 PROCEDURE — 99441 PR PHYSICIAN TELEPHONE EVALUATION 5-10 MIN: CPT | Mod: 93 | Performed by: NURSE PRACTITIONER

## 2023-12-18 RX ORDER — DULOXETIN HYDROCHLORIDE 60 MG/1
60 CAPSULE, DELAYED RELEASE ORAL DAILY
Qty: 90 CAPSULE | Refills: 0 | Status: CANCELLED | OUTPATIENT
Start: 2023-12-18

## 2023-12-18 RX ORDER — DULOXETIN HYDROCHLORIDE 30 MG/1
30 CAPSULE, DELAYED RELEASE ORAL DAILY
Qty: 90 CAPSULE | Refills: 0 | Status: SHIPPED | OUTPATIENT
Start: 2023-12-18 | End: 2024-03-29

## 2023-12-18 ASSESSMENT — PATIENT HEALTH QUESTIONNAIRE - PHQ9
SUM OF ALL RESPONSES TO PHQ QUESTIONS 1-9: 5
SUM OF ALL RESPONSES TO PHQ QUESTIONS 1-9: 5
10. IF YOU CHECKED OFF ANY PROBLEMS, HOW DIFFICULT HAVE THESE PROBLEMS MADE IT FOR YOU TO DO YOUR WORK, TAKE CARE OF THINGS AT HOME, OR GET ALONG WITH OTHER PEOPLE: NOT DIFFICULT AT ALL

## 2023-12-18 ASSESSMENT — ANXIETY QUESTIONNAIRES
GAD7 TOTAL SCORE: 2
4. TROUBLE RELAXING: NOT AT ALL
3. WORRYING TOO MUCH ABOUT DIFFERENT THINGS: NOT AT ALL
1. FEELING NERVOUS, ANXIOUS, OR ON EDGE: SEVERAL DAYS
7. FEELING AFRAID AS IF SOMETHING AWFUL MIGHT HAPPEN: NOT AT ALL
6. BECOMING EASILY ANNOYED OR IRRITABLE: SEVERAL DAYS
GAD7 TOTAL SCORE: 2
5. BEING SO RESTLESS THAT IT IS HARD TO SIT STILL: NOT AT ALL
IF YOU CHECKED OFF ANY PROBLEMS ON THIS QUESTIONNAIRE, HOW DIFFICULT HAVE THESE PROBLEMS MADE IT FOR YOU TO DO YOUR WORK, TAKE CARE OF THINGS AT HOME, OR GET ALONG WITH OTHER PEOPLE: NOT DIFFICULT AT ALL
2. NOT BEING ABLE TO STOP OR CONTROL WORRYING: NOT AT ALL

## 2023-12-18 NOTE — PROGRESS NOTES
"Divya is a 18 year old who is being evaluated via a billable telephone visit.      What phone number would you like to be contacted at? [phone]  How would you like to obtain your AVS? Rubi    Distant Location (provider location):  On-site    Assessment & Plan   Problem List Items Addressed This Visit    None  Visit Diagnoses       Reactive depression        Relevant Medications    DULoxetine (CYMBALTA) 30 MG capsule           Reactive depression, was also taking Cymbalta for low back pain.  Her symptoms are under good control at this time.  She would like to reduce duloxetine and eventually come off the medication.  Duloxetine 30 mg, 90 tablets were sent to the pharmacy.  Recommend reducing by 30 mg over the next couple weeks, if this is too difficult, she can alternate 60 mg to 30 mg for couple weeks then go down to 30 mg daily and then alternate 30 mg with no medication for couple weeks and then discontinue.  If at any point she has worsening back pain or depression, she will let me know and we will go back to her previous effective dose.    Prescription drug management         BMI:   Estimated body mass index is 32.77 kg/m  as calculated from the following:    Height as of 3/7/23: 1.676 m (5' 6\").    Weight as of 3/7/23: 92.1 kg (203 lb).           No follow-ups on file.    [facility]    Subjective   Divya is a 18 year old, presenting for the following health issues:  Recheck Medication      History of Present Illness       Reason for visit:  Medications    She eats 2-3 servings of fruits and vegetables daily.She consumes 1 sweetened beverage(s) daily.She exercises with enough effort to increase her heart rate 10 to 19 minutes per day.  She exercises with enough effort to increase her heart rate 4 days per week.   She is taking medications regularly.     Telephone visit completed today follow-up on duloxetine.  She reports over the past 3 to 4 months " she has had significantly less back pain, depression is going well.  She is happy in [place] and loves her job.  She would like to consider coming off of the duloxetine.      Review of Systems   See above      Objective    Vitals - Patient Reported  Pain Score: No Pain (0)        Physical Exam   healthy, alert, and no distress  PSYCH: Alert and oriented times 3; coherent speech, normal   rate and volume, able to articulate logical thoughts, able   to abstract reason, no tangential thoughts, no hallucinations   or delusions  Her affect is normal  RESP: No cough, no audible wheezing, able to talk in full sentences  Remainder of exam unable to be completed due to telephone visits                Phone call duration: 5 minutes

## 2024-03-20 DIAGNOSIS — F32.9 REACTIVE DEPRESSION: ICD-10-CM

## 2024-03-22 RX ORDER — DULOXETIN HYDROCHLORIDE 60 MG/1
60 CAPSULE, DELAYED RELEASE ORAL DAILY
Qty: 90 CAPSULE | Refills: 0 | OUTPATIENT
Start: 2024-03-22

## 2024-03-22 NOTE — TELEPHONE ENCOUNTER
"[facility] sent Rx request for the following:      Requested Prescriptions   Pending Prescriptions Disp Refills    DULoxetine (CYMBALTA) 60 MG capsule [Pharmacy Med Name: DULOXETINE DR 60MG CAPSULES] 90 capsule 0     Sig: TAKE 1 CAPSULE(60 MG) BY MOUTH DAILY       Serotonin-Norepinephrine Reuptake Inhibitors  Failed - 3/22/2024 11:25 AM        Failed - Blood pressure under 140/90 in past 12 months     BP Readings from Last 3 Encounters:   03/07/23 104/77   02/07/23 107/79   11/03/22 95/70                 Failed - PHQ-9 score of less than 5 in past 6 months     Please review last PHQ-9 score.          Last Prescription Date:   12/18/23  Last Fill Qty/Refills:         90, R-0    Last Office Visit:              12/18/23   Future Office visit:           none    Per LOV: \"She would like to reduce duloxetine and eventually come off the medication. \"    Declined    Larisa Cortes RN on 3/22/2024 at 11:27 AM        "

## 2024-03-26 DIAGNOSIS — F32.9 REACTIVE DEPRESSION: ICD-10-CM

## 2024-03-29 RX ORDER — DULOXETIN HYDROCHLORIDE 30 MG/1
30 CAPSULE, DELAYED RELEASE ORAL DAILY
Qty: 90 CAPSULE | Refills: 0 | Status: SHIPPED | OUTPATIENT
Start: 2024-03-29

## 2024-03-29 NOTE — TELEPHONE ENCOUNTER
[id number] [place] sent Rx request for the following:      Requested Prescriptions   Pending Prescriptions Disp Refills    DULoxetine (CYMBALTA) 30 MG capsule [Pharmacy Med Name: DULOXETINE DR 30MG CAPSULES] 90 capsule 0     Sig: TAKE 1 CAPSULE(30 MG) BY MOUTH DAILY       Serotonin-Norepinephrine Reuptake Inhibitors  Failed - 3/29/2024 11:40 AM        Failed - Blood pressure under 140/90 in past 12 months     BP Readings from Last 3 Encounters:   03/07/23 104/77   02/07/23 107/79   11/03/22 95/70   No data recorded        Failed - PHQ-9 score of less than 5 in past 6 months     Please review last PHQ-9 score.      Last Prescription Date:   12/18/23  Last Fill Qty/Refills:         90, R-0    Last Office Visit:              12/18/23 (VV)   Future Office visit:           None    Per Last VV note:  Telephone visit completed today follow-up on duloxetine.  She reports over the past 3 to 4 months she has had significantly less back pain, depression is going well.  She is happy in [place] and loves her job.  She would like to consider coming off of the duloxetine.     Unable to complete prescription refill per RN Medication Refill Policy.     Lorna Castelan RN .............. 3/29/2024  11:42 AM

## 2025-05-17 ENCOUNTER — HEALTH MAINTENANCE LETTER (OUTPATIENT)
Age: 20
End: 2025-05-17

## (undated) RX ORDER — IBUPROFEN 200 MG
TABLET ORAL
Status: DISPENSED
Start: 2020-02-14